# Patient Record
Sex: MALE | Race: WHITE | NOT HISPANIC OR LATINO | URBAN - METROPOLITAN AREA
[De-identification: names, ages, dates, MRNs, and addresses within clinical notes are randomized per-mention and may not be internally consistent; named-entity substitution may affect disease eponyms.]

---

## 2022-04-16 ENCOUNTER — EMERGENCY (EMERGENCY)
Facility: HOSPITAL | Age: 42
LOS: 1 days | Discharge: HOME | End: 2022-04-16
Admitting: EMERGENCY MEDICINE
Payer: MEDICARE

## 2022-04-16 VITALS
WEIGHT: 198.42 LBS | HEIGHT: 69 IN | DIASTOLIC BLOOD PRESSURE: 87 MMHG | RESPIRATION RATE: 16 BRPM | OXYGEN SATURATION: 97 % | HEART RATE: 102 BPM | SYSTOLIC BLOOD PRESSURE: 150 MMHG | TEMPERATURE: 98 F

## 2022-04-16 DIAGNOSIS — M79.671 PAIN IN RIGHT FOOT: ICD-10-CM

## 2022-04-16 DIAGNOSIS — M79.672 PAIN IN LEFT FOOT: ICD-10-CM

## 2022-04-16 DIAGNOSIS — B35.3 TINEA PEDIS: ICD-10-CM

## 2022-04-16 PROCEDURE — 99283 EMERGENCY DEPT VISIT LOW MDM: CPT | Mod: FS

## 2022-04-16 RX ORDER — NYSTATIN CREAM 100000 [USP'U]/G
1 CREAM TOPICAL ONCE
Refills: 0 | Status: COMPLETED | OUTPATIENT
Start: 2022-04-16 | End: 2022-04-16

## 2022-04-16 RX ADMIN — NYSTATIN CREAM 1 APPLICATION(S): 100000 CREAM TOPICAL at 22:44

## 2022-04-16 NOTE — ED PROVIDER NOTE - NSFOLLOWUPINSTRUCTIONS_ED_ALL_ED_FT
Tinea    Tinea is an infection of the skin caused by funguses. It can present in various areas of the body including the head (tinea capitis), body (tinea corporis or ringworm), groin (tinea cruris or jock itch), and foot (tinea pedis or athlete’s foot). Symptoms include an itchy red rash that may be ring-shaped and have central clearing and scales. Treatment may involve an antifungal cream or medicines by mouth.     SEEK MEDICAL CARE IF YOU HAVE THE FOLLOWING SYMPTOMS: rash continues to spread, rash lasts over 4 weeks, rash get warm, tender, or swollen.

## 2022-04-16 NOTE — ED ADULT NURSE NOTE - CHIEF COMPLAINT QUOTE
patient here claims to be walking from [place], [place] trying to get to [place] and has bilateral foot pain; "my feet are tore up"

## 2022-04-16 NOTE — ED PROVIDER NOTE - NS ED ROS FT
Review of Systems    Constitutional: (-) fever (-) weakness (-) diaphoresis   Cardiovascular: (-) chest pain  (-) palpitations  Respiratory: (-) SOB (-) cough   GI: (-) abdominal pain (-) N/V (-) diarrhea

## 2022-04-16 NOTE — ED PROVIDER NOTE - OBJECTIVE STATEMENT
40 yo m pw gradual onset of worsening foot pain after pt walked for several days to get to [place] from [place], pt reports aching mod in severity non radiating ongoing for 3 d in duration. No fevers or chills.    I have reviewed available current nursing and previous documentation of past medical, surgical, family, and/or social history.

## 2022-04-16 NOTE — ED ADULT TRIAGE NOTE - CHIEF COMPLAINT QUOTE
patient here claims to be walking from [place], [place] trying to get to [place] and has bilateral foot pain; "my feet are tore up"

## 2022-04-16 NOTE — ED PROVIDER NOTE - PATIENT PORTAL LINK FT
You can access the FollowMyHealth Patient Portal offered by [facility] by registering at the following website: [email or web address]. By joining [facility] FollowMyHealth portal, you will also be able to view your health information using other applications (apps) compatible with our system.

## 2022-04-16 NOTE — ED PROVIDER NOTE - PHYSICAL EXAMINATION
Physical Exam    Vital Signs: I have reviewed the initial vital signs.  Constitutional: well-nourished, appears stated age, no acute distress  Cardiovascular: regular rate, regular rhythm, well-perfused extremities  Musculoskeletal: +b/l foot immersion injury with mild ttp, no erythema or induration, with mild epidermis desquamation   Integumentary: warm, dry, no rash  Neurologic: extremities’ motor and sensory functions grossly intact

## 2022-04-16 NOTE — ED ADULT NURSE NOTE - OBJECTIVE STATEMENT
Received pt. from triage nurse with complaints of b/l foot pain. Pt is alert and oriented x3 with no acute distress. Pt reports he came from [facility] and walk from [place] to [place]. Pt denies sob, chest pain, nausea and vomiting .Pt seen and evaluated by physician.  Safety precautions are maintained. Will continue to monitor.

## 2022-04-16 NOTE — ED PROVIDER NOTE - NSFOLLOWUPCLINICS_GEN_ALL_ED_FT
[facility]  Podiatry  [address] [place] [address]  Phone: [phone]  Fax:
